# Patient Record
Sex: FEMALE | Race: AMERICAN INDIAN OR ALASKA NATIVE
[De-identification: names, ages, dates, MRNs, and addresses within clinical notes are randomized per-mention and may not be internally consistent; named-entity substitution may affect disease eponyms.]

---

## 2018-10-19 ENCOUNTER — HOSPITAL ENCOUNTER (OUTPATIENT)
Dept: HOSPITAL 31 - C.ENDO | Age: 58
Discharge: HOME | End: 2018-10-19
Attending: COLON & RECTAL SURGERY
Payer: COMMERCIAL

## 2018-10-19 VITALS — BODY MASS INDEX: 22.3 KG/M2

## 2018-10-19 VITALS — DIASTOLIC BLOOD PRESSURE: 78 MMHG | SYSTOLIC BLOOD PRESSURE: 180 MMHG | RESPIRATION RATE: 12 BRPM | HEART RATE: 63 BPM

## 2018-10-19 VITALS — TEMPERATURE: 97.5 F

## 2018-10-19 VITALS — OXYGEN SATURATION: 100 %

## 2018-10-19 DIAGNOSIS — Z12.11: Primary | ICD-10-CM

## 2018-10-19 DIAGNOSIS — K62.1: ICD-10-CM

## 2018-10-19 DIAGNOSIS — K57.30: ICD-10-CM

## 2018-10-19 PROCEDURE — 88305 TISSUE EXAM BY PATHOLOGIST: CPT

## 2018-10-19 PROCEDURE — 45388 COLONOSCOPY W/ABLATION: CPT

## 2018-10-19 NOTE — CP.SDSHP
Same Day Surgery [facility]





- History


Proposed Procedure: colonoscopy





- Previous Medical/Surgical History


Cardiac: Hypertension


Previous Surgical History: Csection





- Allergies


Allergies: 


Allergies





No Known Allergies Allergy (Verified 10/19/18 09:06)


   











- Physical Exam


Vital Signs: 


                                   Vital Signs











  10/19/18





  09:11


 


Temperature 98.2 F


 


Pulse Rate 57 L


 


Respiratory 19





Rate 


 


Blood Pressure 184/86 H


 


O2 Sat by Pulse 100





Oximetry 














- Date & Time


Date: 10/19/18


Time: 09:42





Short Stay Discharge





- Short Stay Discharge


Admitting Diagnosis/Reason for Visit: ENCOUNTER FOR SCREENING FOR MALIGNANT 

NEOPLASM OF


Disposition: HOME/ ROUTINE


Referrals: 


Ja Romero MD [Primary Care Provider] -